# Patient Record
Sex: FEMALE | Race: WHITE | NOT HISPANIC OR LATINO | URBAN - METROPOLITAN AREA
[De-identification: names, ages, dates, MRNs, and addresses within clinical notes are randomized per-mention and may not be internally consistent; named-entity substitution may affect disease eponyms.]

---

## 2023-11-13 ENCOUNTER — OFFICE VISIT (OUTPATIENT)
Dept: NEUROSURGERY | Facility: CLINIC | Age: 68
End: 2023-11-13
Payer: COMMERCIAL

## 2023-11-13 VITALS
BODY MASS INDEX: 19.83 KG/M2 | SYSTOLIC BLOOD PRESSURE: 129 MMHG | HEIGHT: 60 IN | DIASTOLIC BLOOD PRESSURE: 76 MMHG | RESPIRATION RATE: 18 BRPM | WEIGHT: 101 LBS | TEMPERATURE: 97.9 F | HEART RATE: 87 BPM

## 2023-11-13 DIAGNOSIS — M54.17 LEFT LUMBOSACRAL RADICULOPATHY: Primary | ICD-10-CM

## 2023-11-13 PROCEDURE — 99212 OFFICE O/P EST SF 10 MIN: CPT | Performed by: NURSE PRACTITIONER

## 2023-11-13 PROCEDURE — 99202 OFFICE O/P NEW SF 15 MIN: CPT | Performed by: NURSE PRACTITIONER

## 2023-11-13 PROCEDURE — 1159F MED LIST DOCD IN RCRD: CPT | Performed by: NURSE PRACTITIONER

## 2023-11-13 PROCEDURE — 1125F AMNT PAIN NOTED PAIN PRSNT: CPT | Performed by: NURSE PRACTITIONER

## 2023-11-13 SDOH — ECONOMIC STABILITY: FOOD INSECURITY: WITHIN THE PAST 12 MONTHS, YOU WORRIED THAT YOUR FOOD WOULD RUN OUT BEFORE YOU GOT MONEY TO BUY MORE.: NEVER TRUE

## 2023-11-13 SDOH — ECONOMIC STABILITY: FOOD INSECURITY: WITHIN THE PAST 12 MONTHS, THE FOOD YOU BOUGHT JUST DIDN'T LAST AND YOU DIDN'T HAVE MONEY TO GET MORE.: NEVER TRUE

## 2023-11-13 ASSESSMENT — LIFESTYLE VARIABLES: HOW MANY STANDARD DRINKS CONTAINING ALCOHOL DO YOU HAVE ON A TYPICAL DAY: PATIENT DOES NOT DRINK

## 2023-11-13 ASSESSMENT — PAIN SCALES - GENERAL: PAINLEVEL: 6

## 2023-11-13 ASSESSMENT — PATIENT HEALTH QUESTIONNAIRE - PHQ9
SUM OF ALL RESPONSES TO PHQ9 QUESTIONS 1 & 2: 0
1. LITTLE INTEREST OR PLEASURE IN DOING THINGS: NOT AT ALL
2. FEELING DOWN, DEPRESSED OR HOPELESS: NOT AT ALL

## 2023-11-13 ASSESSMENT — COLUMBIA-SUICIDE SEVERITY RATING SCALE - C-SSRS
1. IN THE PAST MONTH, HAVE YOU WISHED YOU WERE DEAD OR WISHED YOU COULD GO TO SLEEP AND NOT WAKE UP?: NO
6. HAVE YOU EVER DONE ANYTHING, STARTED TO DO ANYTHING, OR PREPARED TO DO ANYTHING TO END YOUR LIFE?: NO
2. HAVE YOU ACTUALLY HAD ANY THOUGHTS OF KILLING YOURSELF?: NO

## 2023-11-13 NOTE — PROGRESS NOTES
It was a pleasure to see Jessy Hoffman on 11/13/2023. She is a 68 y.o. year old female who presents to the [facility] for evaluation of lumbosacral radiculopathy. Patient is referred by No ref. provider found. PMH is significant for LLE DVT in 2022, breast cancer (remote -cured by mastectomy per patient),     Jessy Hoffman has had symptoms of LBP radiating into posterior left leg into foot since attempting to pick her ex- up off the floor, 4 - 5 weeks ago. Progression of symptoms prompted today's visit. Thus far, patient has tried acetaminophen with little to no improvement of symptoms. She sleeps on the couch  because of her back; when she turns on the couch, she gets immediate pain radiating into her left leg. She reports constipation, but otherwise, she denies change in bowel / bladder function, saddle anesthesia, imbalance, falls, difficulty dressing, difficulty holding / opening objects.     PREVIOUS TREATMENTS  Acetaminophen  Topical - Biofreeze helps calf pain at times    Previous Spine Surgery: No     Smoker: YES   Anticoagulation / Antiplatelets: YES: Eliquis for DVT in 2022    ROS: 12 / 12 systems reviewed and are negative unless noted in HPI    ON EXAM:  General: Well developed, awake/alert/oriented x 3, no distress, alert and cooperative  Skin: Warm and dry, no visible lesions / rashes  ENMT: Mucous membranes moist, no apparent injury  Head/Neck: No apparent injury  Respiratory/Thorax: Normal breathing with good chest expansion, thorax symmetric  Cardiovascular: No pitting edema, no JVD  Gastrointestinal: Non-distended  NEUROLOGICAL EXAM:  EOMI, face symmetric  Motor Strength: 5/5 in BLE  Range of Motion:  difficulty with crossing left knee over right; otherwise, normal in all extremities  Muscle Tone: Normal without spasticity or contractures in all extremities  Muscle Bulk: Normal and symmetric in all extremities  Posture:  -- Cervical: Normal  --  Thoracic: Normal  -- Lumbar : Normal  Paraspinal muscle spasm/tenderness absent.  No palpable tenderness along the spinous processes.  Sensation: SILT in BLE  Gait: Normal  Deep Tendon Reflexes: 2+ BLE      Jessy Hoffman has left lumbosacral radiculopathy by symptom report. She has no neuro deficits on exam today. We discussed rationale for dynamic lumbar imaging and for Physical Therapy for Home Exercise Program. Encouraged follow up in 6 weeks for reassessment. If not improved, will consider MRI L Spine.   She verbalizes understanding and agreement with plan.  TOTAL TIME SPENT:   Time preparing / completing chart 7 MIN  Time spent face to face with patient, including counseling >15 MIN

## 2023-11-14 ENCOUNTER — HOSPITAL ENCOUNTER (OUTPATIENT)
Dept: RADIOLOGY | Facility: HOSPITAL | Age: 68
Discharge: HOME | End: 2023-11-14
Payer: COMMERCIAL

## 2023-11-14 ENCOUNTER — TELEPHONE (OUTPATIENT)
Dept: INTERNAL MEDICINE | Facility: HOSPITAL | Age: 68
End: 2023-11-14

## 2023-11-14 DIAGNOSIS — M54.17 LEFT LUMBOSACRAL RADICULOPATHY: ICD-10-CM

## 2023-11-14 PROCEDURE — 72114 X-RAY EXAM L-S SPINE BENDING: CPT | Performed by: RADIOLOGY

## 2023-11-14 PROCEDURE — 72114 X-RAY EXAM L-S SPINE BENDING: CPT | Mod: FY

## 2023-11-20 ENCOUNTER — TELEPHONE (OUTPATIENT)
Dept: NEUROSURGERY | Facility: CLINIC | Age: 68
End: 2023-11-20
Payer: COMMERCIAL

## 2023-11-21 NOTE — TELEPHONE ENCOUNTER
Patient received results of her XR, thank you.    Today she went to reach for an item and she felt sharp pain in her LT buttock that shot down her leg. She almost went down. She feels there is something more going on then just arthritis and will follow up with Dr. Reyez right now. She just wanted to make us aware.

## 2023-12-26 ENCOUNTER — APPOINTMENT (OUTPATIENT)
Dept: NEUROSURGERY | Facility: CLINIC | Age: 68
End: 2023-12-26
Payer: COMMERCIAL

## 2025-07-09 ENCOUNTER — HOSPITAL ENCOUNTER (EMERGENCY)
Facility: HOSPITAL | Age: 70
Discharge: HOME | End: 2025-07-09
Payer: COMMERCIAL

## 2025-07-09 VITALS
WEIGHT: 100 LBS | OXYGEN SATURATION: 99 % | BODY MASS INDEX: 19.63 KG/M2 | SYSTOLIC BLOOD PRESSURE: 136 MMHG | HEART RATE: 86 BPM | DIASTOLIC BLOOD PRESSURE: 75 MMHG | HEIGHT: 60 IN | TEMPERATURE: 99.3 F | RESPIRATION RATE: 18 BRPM

## 2025-07-09 DIAGNOSIS — S41.112A SKIN TEAR OF LEFT UPPER ARM WITHOUT COMPLICATION, INITIAL ENCOUNTER: Primary | ICD-10-CM

## 2025-07-09 PROCEDURE — 2500000004 HC RX 250 GENERAL PHARMACY W/ HCPCS (ALT 636 FOR OP/ED): Performed by: PHYSICIAN ASSISTANT

## 2025-07-09 PROCEDURE — 90715 TDAP VACCINE 7 YRS/> IM: CPT | Performed by: PHYSICIAN ASSISTANT

## 2025-07-09 PROCEDURE — 99282 EMERGENCY DEPT VISIT SF MDM: CPT | Mod: 25

## 2025-07-09 PROCEDURE — 90471 IMMUNIZATION ADMIN: CPT | Performed by: PHYSICIAN ASSISTANT

## 2025-07-09 RX ADMIN — TETANUS TOXOID, REDUCED DIPHTHERIA TOXOID AND ACELLULAR PERTUSSIS VACCINE, ADSORBED 0.5 ML: 5; 2.5; 8; 8; 2.5 SUSPENSION INTRAMUSCULAR at 17:19

## 2025-07-09 ASSESSMENT — PAIN SCALES - GENERAL: PAINLEVEL_OUTOF10: 2

## 2025-07-09 ASSESSMENT — PAIN - FUNCTIONAL ASSESSMENT: PAIN_FUNCTIONAL_ASSESSMENT: 0-10

## 2025-07-09 NOTE — DISCHARGE INSTRUCTIONS
Keep your wound clean and dry.  Change the dressing at least once daily.  You were referred to the wound care clinic for follow-up appointment.  Look for signs of infection such as redness, streaking, drainage.  Return to ER if symptoms change or worsen

## 2025-07-09 NOTE — ED PROVIDER NOTES
Limitations to History: None  External Records Reviewed  Independent Historians: Self  Social determinants affecting care: None    HPI  Jessy Hoffman is a 70 y.o. female who presents emergency department for assessment of a skin tear.  She reports that she accidentally injured her left forearm.  She is not up-to-date on tetanus immunization.  Denies any pain to the area.  She was concerned that she may need stitches or dressing.  She denies any other injuries.  She is no further complaints.    [facility]  Medical History[1] reviewed by myself.    Meds  Current Outpatient Medications   Medication Instructions    apixaban (ELIQUIS) 5 mg, oral, 2 times daily       Allergies  RX Allergies[2] reviewed by myself.    SHx  Social History[3] reviewed by myself.      ------------------------------------------------------------------------------------------------------------------------------------------    /75   Pulse 86   Temp 37.4 °C (99.3 °F)   Resp 18   Ht (!) 1.524 m (5')   Wt 45.4 kg (100 lb)   SpO2 99%   BMI 19.53 kg/m²     Physical Exam  Vitals and nursing note reviewed.   Constitutional:       Appearance: Normal appearance.   HENT:      Head: Normocephalic.      Nose: Nose normal.      Mouth/Throat:      Mouth: Mucous membranes are moist.   Eyes:      Extraocular Movements: Extraocular movements intact.      Conjunctiva/sclera: Conjunctivae normal.   Cardiovascular:      Rate and Rhythm: Normal rate and regular rhythm.   Pulmonary:      Effort: Pulmonary effort is normal.      Breath sounds: Normal breath sounds.   Musculoskeletal:         General: Normal range of motion.      Cervical back: Neck supple.   Skin:     General: Skin is warm and dry.      Comments: Irregularly shaped skin tear to the left forearm posterior surface.  No active bleeding.  No tenderness.  No bruising.   Neurological:      Mental Status: She is alert and oriented to person, place, and time.   Psychiatric:         Attention and  Perception: Attention normal.         Mood and Affect: Mood normal.          ------------------------------------------------------------------------------------------------------------------------------------------  Labs  Labs Reviewed - No data to display     Imaging  No orders to display        ED Course  Diagnoses as of 07/09/25 1705   Skin tear of left upper arm without complication, initial encounter        Medical Decision Making: She did not appear ill or toxic.  Vital signs reviewed and stable.  Wound was cleaned by nursing staff.  Xeroform was placed over the skin tear along with nonadherent dressing.  Discussed with the patient to keep the wound clean and dry.  She was educated on signs and symptoms of infection.  She is to follow with the wound care clinic.  She is to change dressing at least once daily.  She is to return to ER immediately if symptoms change or worsen.  She verbalized understanding and agreed to plan of care.  She was discharged home in stable condition.    Diagnosis: Skin tear  Plan: discharge       [1]   Past Medical History:  Diagnosis Date    DVT (deep venous thrombosis) (Multi)     Facial basal cell cancer    [2] No Known Allergies  [3]   Social History  Tobacco Use    Smoking status: Every Day     Types: Cigarettes    Smokeless tobacco: Never   Substance Use Topics    Alcohol use: Not Currently    Drug use: Never        Sen Arevalo PA-C  07/09/25 170